# Patient Record
Sex: MALE | URBAN - METROPOLITAN AREA
[De-identification: names, ages, dates, MRNs, and addresses within clinical notes are randomized per-mention and may not be internally consistent; named-entity substitution may affect disease eponyms.]

---

## 2021-10-13 ENCOUNTER — WEB ENCOUNTER (OUTPATIENT)
Dept: URBAN - METROPOLITAN AREA CLINIC 82 | Facility: CLINIC | Age: 21
End: 2021-10-13

## 2021-10-13 ENCOUNTER — LAB OUTSIDE AN ENCOUNTER (OUTPATIENT)
Dept: URBAN - METROPOLITAN AREA CLINIC 82 | Facility: CLINIC | Age: 21
End: 2021-10-13

## 2021-10-13 ENCOUNTER — OFFICE VISIT (OUTPATIENT)
Dept: URBAN - METROPOLITAN AREA CLINIC 82 | Facility: CLINIC | Age: 21
End: 2021-10-13
Payer: COMMERCIAL

## 2021-10-13 VITALS
BODY MASS INDEX: 19.19 KG/M2 | HEART RATE: 67 BPM | TEMPERATURE: 97.7 F | SYSTOLIC BLOOD PRESSURE: 131 MMHG | DIASTOLIC BLOOD PRESSURE: 78 MMHG | HEIGHT: 68 IN | WEIGHT: 126.6 LBS

## 2021-10-13 DIAGNOSIS — R10.84 ABDOMINAL CRAMPING, GENERALIZED: ICD-10-CM

## 2021-10-13 DIAGNOSIS — K92.1 BLOOD IN THE STOOL: ICD-10-CM

## 2021-10-13 PROCEDURE — G9906 PT RECV TBCO CESS INTERV: HCPCS | Performed by: INTERNAL MEDICINE

## 2021-10-13 PROCEDURE — 99204 OFFICE O/P NEW MOD 45 MIN: CPT | Performed by: INTERNAL MEDICINE

## 2021-10-13 PROCEDURE — G8427 DOCREV CUR MEDS BY ELIG CLIN: HCPCS | Performed by: INTERNAL MEDICINE

## 2021-10-13 PROCEDURE — 99244 OFF/OP CNSLTJ NEW/EST MOD 40: CPT | Performed by: INTERNAL MEDICINE

## 2021-10-13 PROCEDURE — G8418 CALC BMI BLW LOW PARAM F/U: HCPCS | Performed by: INTERNAL MEDICINE

## 2021-10-13 PROCEDURE — 4004F PT TOBACCO SCREEN RCVD TLK: CPT | Performed by: INTERNAL MEDICINE

## 2021-10-13 PROCEDURE — G9902 PT SCRN TBCO AND ID AS USER: HCPCS | Performed by: INTERNAL MEDICINE

## 2021-10-13 RX ORDER — DICYCLOMINE HYDROCHLORIDE 10 MG/1
1 TABLET CAPSULE ORAL THREE TIMES A DAY
Qty: 90 | Refills: 1 | OUTPATIENT
Start: 2021-10-13 | End: 2021-12-11

## 2021-10-13 RX ORDER — SODIUM PICOSULFATE, MAGNESIUM OXIDE, AND ANHYDROUS CITRIC ACID 10; 3.5; 12 MG/160ML; G/160ML; G/160ML
160 ML LIQUID ORAL
Qty: 320 MILLILITER | Refills: 0 | OUTPATIENT
Start: 2021-10-13 | End: 2021-10-14

## 2021-10-13 NOTE — HPI-TODAY'S VISIT:
10/13/2021 Patient is a 20 year old White male who presents on referral from Dr. Martin James MD for evaluation of abdominal distention. A copy of the note will be sent to the referring provider. CT of abdomen pelvis on 5/4/2021 showed appendicitis and thickening of the sigmoid and descending colon. Underwent appendectomy afterwards.   In April, patient began experiencing excess belching and bloating. He had appendectomy in May, but he still c/o abdominal swelling afterwards. He had COVID infection August which caused him dyspepsia and lower back pain and LLQ pain. Urinalysis showed proteinuria. Denies any constipation. He did notice first instance of blood and mucus in the stool this morning. No known family history of colitis or celiac disease. Symptoms mostly present nocturnally.

## 2021-10-25 ENCOUNTER — WEB ENCOUNTER (OUTPATIENT)
Dept: URBAN - METROPOLITAN AREA CLINIC 82 | Facility: CLINIC | Age: 21
End: 2021-10-25

## 2021-10-25 RX ORDER — HYOSCYAMINE SULFATE 0.12 MG/1
1 TABLET AS NEEDED TABLET ORAL
Qty: 90 | Refills: 1 | OUTPATIENT
Start: 2021-11-01 | End: 2021-12-30

## 2021-10-26 ENCOUNTER — OFFICE VISIT (OUTPATIENT)
Dept: URBAN - NONMETROPOLITAN AREA CLINIC 13 | Facility: CLINIC | Age: 21
End: 2021-10-26

## 2021-11-09 ENCOUNTER — OFFICE VISIT (OUTPATIENT)
Dept: URBAN - NONMETROPOLITAN AREA CLINIC 13 | Facility: CLINIC | Age: 21
End: 2021-11-09

## 2021-11-12 ENCOUNTER — ERX REFILL RESPONSE (OUTPATIENT)
Dept: URBAN - METROPOLITAN AREA CLINIC 82 | Facility: CLINIC | Age: 21
End: 2021-11-12

## 2021-11-12 RX ORDER — DICYCLOMINE HYDROCHLORIDE 10 MG/1
1 TABLET CAPSULE ORAL THREE TIMES A DAY
Qty: 90 | Refills: 1 | OUTPATIENT

## 2021-11-12 RX ORDER — DICYCLOMINE HYDROCHLORIDE 10 MG/1
TAKE 1 CAPSULE BY MOUTH THREE TIMES A DAY CAPSULE ORAL
Qty: 90 CAPSULE | Refills: 2 | OUTPATIENT

## 2021-11-22 ENCOUNTER — ERX REFILL RESPONSE (OUTPATIENT)
Dept: URBAN - METROPOLITAN AREA CLINIC 82 | Facility: CLINIC | Age: 21
End: 2021-11-22

## 2021-11-22 RX ORDER — DICYCLOMINE HYDROCHLORIDE 10 MG/1
TAKE 1 CAPSULE BY MOUTH THREE TIMES A DAY 30 CAPSULE ORAL
Qty: 270 CAPSULE | Refills: 1 | OUTPATIENT

## 2021-11-22 RX ORDER — DICYCLOMINE HYDROCHLORIDE 10 MG/1
TAKE 1 CAPSULE BY MOUTH THREE TIMES A DAY CAPSULE ORAL
Qty: 90 CAPSULE | Refills: 2 | OUTPATIENT

## 2021-11-29 ENCOUNTER — ERX REFILL RESPONSE (OUTPATIENT)
Dept: URBAN - METROPOLITAN AREA CLINIC 82 | Facility: CLINIC | Age: 21
End: 2021-11-29

## 2021-11-29 RX ORDER — HYOSCYAMINE SULFATE 0.12 MG/1
1 TABLET AS NEEDED TABLET ORAL
Qty: 90 | Refills: 1 | OUTPATIENT

## 2021-11-29 RX ORDER — HYOSCYAMINE SULFATE 0.12 MG/1
TAKE 1 TABLET BY MOUTH EVERY 8 HOURS AS NEEDED FOR 30 DAYS TABLET ORAL
Qty: 90 TABLET | Refills: 2 | OUTPATIENT

## 2021-12-17 ENCOUNTER — OFFICE VISIT (OUTPATIENT)
Dept: URBAN - METROPOLITAN AREA SURGERY CENTER 13 | Facility: SURGERY CENTER | Age: 21
End: 2021-12-17

## 2022-01-04 ENCOUNTER — P2P PATIENT RECORD (OUTPATIENT)
Age: 22
End: 2022-01-04

## 2022-02-11 ENCOUNTER — OFFICE VISIT (OUTPATIENT)
Dept: URBAN - METROPOLITAN AREA SURGERY CENTER 13 | Facility: SURGERY CENTER | Age: 22
End: 2022-02-11

## 2022-03-11 ENCOUNTER — WEB ENCOUNTER (OUTPATIENT)
Dept: URBAN - METROPOLITAN AREA SURGERY CENTER 13 | Facility: SURGERY CENTER | Age: 22
End: 2022-03-11

## 2022-03-14 ENCOUNTER — TELEPHONE ENCOUNTER (OUTPATIENT)
Dept: URBAN - METROPOLITAN AREA CLINIC 82 | Facility: CLINIC | Age: 22
End: 2022-03-14

## 2022-03-16 ENCOUNTER — CLAIMS CREATED FROM THE CLAIM WINDOW (OUTPATIENT)
Dept: URBAN - METROPOLITAN AREA CLINIC 4 | Facility: CLINIC | Age: 22
End: 2022-03-16
Payer: COMMERCIAL

## 2022-03-16 ENCOUNTER — OFFICE VISIT (OUTPATIENT)
Dept: URBAN - METROPOLITAN AREA SURGERY CENTER 13 | Facility: SURGERY CENTER | Age: 22
End: 2022-03-16
Payer: COMMERCIAL

## 2022-03-16 DIAGNOSIS — K63.89 SMALL BOWEL EDEMA: ICD-10-CM

## 2022-03-16 DIAGNOSIS — K62.89 ACUTE PROCTITIS: ICD-10-CM

## 2022-03-16 DIAGNOSIS — D12.3 BENIGN NEOPLASM OF TRANSVERSE COLON: ICD-10-CM

## 2022-03-16 DIAGNOSIS — D12.3 ADENOMA OF TRANSVERSE COLON: ICD-10-CM

## 2022-03-16 DIAGNOSIS — K92.1 ACUTE MELENA: ICD-10-CM

## 2022-03-16 PROCEDURE — G8907 PT DOC NO EVENTS ON DISCHARG: HCPCS | Performed by: INTERNAL MEDICINE

## 2022-03-16 PROCEDURE — 45385 COLONOSCOPY W/LESION REMOVAL: CPT | Performed by: INTERNAL MEDICINE

## 2022-03-16 PROCEDURE — 45380 COLONOSCOPY AND BIOPSY: CPT | Performed by: INTERNAL MEDICINE

## 2022-03-16 PROCEDURE — 88305 TISSUE EXAM BY PATHOLOGIST: CPT | Performed by: PATHOLOGY

## 2022-03-21 ENCOUNTER — TELEPHONE ENCOUNTER (OUTPATIENT)
Dept: URBAN - METROPOLITAN AREA SURGERY CENTER 13 | Facility: SURGERY CENTER | Age: 22
End: 2022-03-21

## 2022-04-29 ENCOUNTER — LAB OUTSIDE AN ENCOUNTER (OUTPATIENT)
Dept: URBAN - METROPOLITAN AREA CLINIC 82 | Facility: CLINIC | Age: 22
End: 2022-04-29

## 2022-04-29 ENCOUNTER — DASHBOARD ENCOUNTERS (OUTPATIENT)
Age: 22
End: 2022-04-29

## 2022-04-29 ENCOUNTER — OFFICE VISIT (OUTPATIENT)
Dept: URBAN - METROPOLITAN AREA CLINIC 82 | Facility: CLINIC | Age: 22
End: 2022-04-29
Payer: COMMERCIAL

## 2022-04-29 DIAGNOSIS — Z86.010 PERSONAL HISTORY OF COLON POLYPS: ICD-10-CM

## 2022-04-29 DIAGNOSIS — R10.9 ABDOMINAL PAIN: ICD-10-CM

## 2022-04-29 DIAGNOSIS — K92.1 BLOOD IN THE STOOL: ICD-10-CM

## 2022-04-29 PROBLEM — 21522001 ABDOMINAL PAIN: Status: ACTIVE | Noted: 2022-01-12

## 2022-04-29 PROCEDURE — G8418 CALC BMI BLW LOW PARAM F/U: HCPCS | Performed by: INTERNAL MEDICINE

## 2022-04-29 PROCEDURE — G9902 PT SCRN TBCO AND ID AS USER: HCPCS | Performed by: INTERNAL MEDICINE

## 2022-04-29 PROCEDURE — G8427 DOCREV CUR MEDS BY ELIG CLIN: HCPCS | Performed by: INTERNAL MEDICINE

## 2022-04-29 PROCEDURE — 99214 OFFICE O/P EST MOD 30 MIN: CPT | Performed by: INTERNAL MEDICINE

## 2022-04-29 RX ORDER — DICYCLOMINE HYDROCHLORIDE 10 MG/1
TAKE 1 CAPSULE BY MOUTH THREE TIMES A DAY 30 CAPSULE ORAL
Qty: 270 CAPSULE | Refills: 1 | Status: ACTIVE | COMMUNITY

## 2022-04-29 RX ORDER — HYOSCYAMINE SULFATE 0.12 MG/1
TAKE 1 TABLET BY MOUTH EVERY 8 HOURS AS NEEDED FOR 30 DAYS TABLET ORAL
Qty: 90 TABLET | Refills: 2 | Status: ACTIVE | COMMUNITY

## 2022-04-29 NOTE — HPI-TODAY'S VISIT:
10/13/2021 Patient is a 20 year old White male who presents on referral from Dr. Martin James MD for evaluation of abdominal distention. A copy of the note will be sent to the referring provider. CT of abdomen pelvis on 5/4/2021 showed appendicitis and thickening of the sigmoid and descending colon. Underwent appendectomy afterwards.   In April, patient began experiencing excess belching and bloating. He had appendectomy in May, but he still c/o abdominal swelling afterwards. He had COVID infection August which caused him dyspepsia and lower back pain and LLQ pain. Urinalysis showed proteinuria. Denies any constipation. He did notice first instance of blood and mucus in the stool this morning. No known family history of colitis or celiac disease. Symptoms mostly present nocturnally.   4/29/2022  Colonoscopy on 3/16/2022 showed hemorrhoids, inflamed mucosa in the rectum, and one 25mm polyp in the transverse colon. Biopsy of the rectum was benign. Polyp was sessile serrated adenoma. Patient states he is feeling significantly better. He continues to have occasional abdominal soreness, mostly in LUQ. Adominal pain worsens nocturnally and after eating. Dicyclomine and Hyoscyamine are both ineffective for him. He also notes adverse side effects from these medications, so he has been off of them for the past few months. 11-Apr-2017 14:27 11-Apr-2017 20:56 18-Apr-2017